# Patient Record
Sex: FEMALE | Race: WHITE | Employment: OTHER | URBAN - METROPOLITAN AREA
[De-identification: names, ages, dates, MRNs, and addresses within clinical notes are randomized per-mention and may not be internally consistent; named-entity substitution may affect disease eponyms.]

---

## 2024-10-25 RX ORDER — ANASTROZOLE 1 MG/1
1 TABLET ORAL DAILY
COMMUNITY
Start: 2024-07-12

## 2024-10-25 RX ORDER — ASPIRIN 81 MG/1
81 TABLET ORAL DAILY
COMMUNITY

## 2024-10-25 RX ORDER — ROSUVASTATIN CALCIUM 40 MG/1
40 TABLET, COATED ORAL DAILY
COMMUNITY
Start: 2024-06-02

## 2024-10-25 RX ORDER — VITAMIN B COMPLEX
2000 TABLET ORAL DAILY
COMMUNITY

## 2024-11-07 ENCOUNTER — ANESTHESIA EVENT (OUTPATIENT)
Age: 64
End: 2024-11-07
Payer: MEDICARE

## 2024-11-08 ENCOUNTER — HOSPITAL ENCOUNTER (OUTPATIENT)
Age: 64
Setting detail: OUTPATIENT SURGERY
Discharge: HOME | End: 2024-11-08
Attending: INTERNAL MEDICINE | Admitting: INTERNAL MEDICINE
Payer: MEDICARE

## 2024-11-08 ENCOUNTER — ANESTHESIA (OUTPATIENT)
Age: 64
End: 2024-11-08
Payer: MEDICARE

## 2024-11-08 VITALS
BODY MASS INDEX: 22.66 KG/M2 | SYSTOLIC BLOOD PRESSURE: 115 MMHG | TEMPERATURE: 97.8 F | HEIGHT: 69 IN | DIASTOLIC BLOOD PRESSURE: 62 MMHG | WEIGHT: 153 LBS | HEART RATE: 60 BPM | RESPIRATION RATE: 15 BRPM | OXYGEN SATURATION: 100 %

## 2024-11-08 DIAGNOSIS — Z12.11 COLON CANCER SCREENING: ICD-10-CM

## 2024-11-08 PROCEDURE — 7100000010 HC PHASE II RECOVERY - FIRST 15 MIN: Performed by: INTERNAL MEDICINE

## 2024-11-08 PROCEDURE — 2580000003 HC RX 258: Performed by: ANESTHESIOLOGY

## 2024-11-08 PROCEDURE — 3700000001 HC ADD 15 MINUTES (ANESTHESIA): Performed by: INTERNAL MEDICINE

## 2024-11-08 PROCEDURE — 7100000011 HC PHASE II RECOVERY - ADDTL 15 MIN: Performed by: INTERNAL MEDICINE

## 2024-11-08 PROCEDURE — 2709999900 HC NON-CHARGEABLE SUPPLY: Performed by: INTERNAL MEDICINE

## 2024-11-08 PROCEDURE — 2500000003 HC RX 250 WO HCPCS

## 2024-11-08 PROCEDURE — 88305 TISSUE EXAM BY PATHOLOGIST: CPT

## 2024-11-08 PROCEDURE — 3609010600 HC COLONOSCOPY POLYPECTOMY SNARE/COLD BIOPSY: Performed by: INTERNAL MEDICINE

## 2024-11-08 PROCEDURE — 6360000002 HC RX W HCPCS

## 2024-11-08 PROCEDURE — 3700000000 HC ANESTHESIA ATTENDED CARE: Performed by: INTERNAL MEDICINE

## 2024-11-08 RX ORDER — ONDANSETRON 2 MG/ML
4 INJECTION INTRAMUSCULAR; INTRAVENOUS
Status: DISCONTINUED | OUTPATIENT
Start: 2024-11-08 | End: 2024-11-08 | Stop reason: HOSPADM

## 2024-11-08 RX ORDER — PROPOFOL 10 MG/ML
INJECTION, EMULSION INTRAVENOUS
Status: DISCONTINUED | OUTPATIENT
Start: 2024-11-08 | End: 2024-11-08 | Stop reason: SDUPTHER

## 2024-11-08 RX ORDER — SODIUM CHLORIDE 0.9 % (FLUSH) 0.9 %
5-40 SYRINGE (ML) INJECTION PRN
Status: DISCONTINUED | OUTPATIENT
Start: 2024-11-08 | End: 2024-11-08 | Stop reason: HOSPADM

## 2024-11-08 RX ORDER — SODIUM CHLORIDE 0.9 % (FLUSH) 0.9 %
5-40 SYRINGE (ML) INJECTION EVERY 12 HOURS SCHEDULED
Status: DISCONTINUED | OUTPATIENT
Start: 2024-11-08 | End: 2024-11-08 | Stop reason: HOSPADM

## 2024-11-08 RX ORDER — NALOXONE HYDROCHLORIDE 0.4 MG/ML
INJECTION, SOLUTION INTRAMUSCULAR; INTRAVENOUS; SUBCUTANEOUS PRN
Status: DISCONTINUED | OUTPATIENT
Start: 2024-11-08 | End: 2024-11-08 | Stop reason: HOSPADM

## 2024-11-08 RX ORDER — SODIUM CHLORIDE 9 MG/ML
INJECTION, SOLUTION INTRAVENOUS PRN
Status: DISCONTINUED | OUTPATIENT
Start: 2024-11-08 | End: 2024-11-08 | Stop reason: HOSPADM

## 2024-11-08 RX ORDER — LIDOCAINE HYDROCHLORIDE 20 MG/ML
INJECTION, SOLUTION INFILTRATION; PERINEURAL
Status: DISCONTINUED | OUTPATIENT
Start: 2024-11-08 | End: 2024-11-08 | Stop reason: SDUPTHER

## 2024-11-08 RX ADMIN — LIDOCAINE HYDROCHLORIDE 80 MG: 20 INJECTION, SOLUTION INFILTRATION; PERINEURAL at 10:10

## 2024-11-08 RX ADMIN — Medication 10 ML: at 10:10

## 2024-11-08 RX ADMIN — Medication 20 ML: at 10:23

## 2024-11-08 RX ADMIN — PROPOFOL 70 MG: 10 INJECTION, EMULSION INTRAVENOUS at 10:10

## 2024-11-08 RX ADMIN — PROPOFOL 250 MCG/KG/MIN: 10 INJECTION, EMULSION INTRAVENOUS at 10:11

## 2024-11-08 ASSESSMENT — PAIN - FUNCTIONAL ASSESSMENT
PAIN_FUNCTIONAL_ASSESSMENT: 0-10

## 2024-11-08 ASSESSMENT — ENCOUNTER SYMPTOMS: SHORTNESS OF BREATH: 0

## 2024-11-08 NOTE — DISCHARGE INSTRUCTIONS
COLONSCOPY DISCHARGE INSTRUCTIONS    You may experience some lightheadedness for the next several hours.  Plan on quiet relaxation for the rest of today. Nap for four hours following procedure if possible.  A responsible adult needs to stay with you today.    Eat bland food and avoid anything greasy or spicy initially-progress to your normal diet gradually.  Diet restrictions as instructed.  You may resume home medications as instructed.    It is not unusual to experience some mild cramping or gas pains, and you may not have a bowel movement for several days.  If you had a polyp removed, avoid strenuous activity for 48 hours.  Avoid the use of aspirin or related compounds for one week, unless otherwise instructed by your physician.    You may notice a small amount of blood in your next few bowel movements, but if a large amount passes, call your physician.    If you have any of the following problems, notify your physician or return to the hospital emergency room : fever, chills, excessive bleeding, excessive vomiting, difficulty swallowing, uncontrolled pain, increased abdominal distention, shortness of breath or any other problems.    Call your doctor at [phone] if you have any concerns.     If you had any biopsies or polyps call for results in 5-7 business days.    See your physician's report for details about your procedure and recommendations.      ANESTHESIA DISCHARGE INSTRUCTIONS    Wear your seatbelt home.    You are under the influence of drugs-do not drink alcohol, drive ,operate machinery,or make any important decisions or sign any legal documentsfor 24 hours. You may resume normal activities tomorrow.    A responsible adult needs to be with you for 24 hours.    You may experience lightheadedness,dizziness,or sleepiness following surgery.    Rest at home today- increase activity as tolerated. It is recommended to take a four hour nap after procedure.    Progress slowly to a regular diet unless your  physician has instructed you otherwise. Avoid spicy and greasy food on first meal.  Drink plenty of water.    If nausea becomes a problem call your physician.

## 2024-11-08 NOTE — OP NOTE
[facility]  Patient: JULITO KEENE  MRN: [id number]  : 1960  Account: [id number]  Sex at Birth: Female  Age: 64 Years  Procedure: Colonoscopy  Date: 2024  Attending Physician: JONATHAN SHELTON  Indications:         -  Screening for colorectal malignant neoplasm  Medications:         -  See the Anesthesia note for documentation of the administered medications  Complications:         -  No immediate complications.  Estimated Blood Loss:         -  Estimated blood loss was minimal.  Procedure:         - The CF colon scope was introduced through the anus and advanced to the            cecum, identified by appendiceal orifice and ileocecal valve.         -  The patient tolerated the procedure well.         -  The quality of the bowel preparation was good.         -  The ileocecal valve, appendiceal orifice, and rectum were photographed.  Findings:         -  A 4 mm polyp was found in the cecum.  The polyp was sessile.  The polyp was            removed with a cold snare.   Resection and retrieval were complete.         -  Internal hemorrhoids were found during retroflexion.  The hemorrhoids were            mild.  Impression:         -  One 4 mm polyp in the cecum, removed with a cold snare.  Resected and            retrieved.         -  Internal hemorrhoids.  Recommendation:         -  Await pathology results.         -  Repeat colonoscopy for surveillance based on pathology results.  Procedure Code(s):         - 79179, Colonoscopy, flexible; with removal of tumor(s), polyp(s), or other            lesion(s) by snare technique  Diagnosis Code(s):         - Z12.11, Encounter for screening for malignant neoplasm of colon         - D12.0, Benign neoplasm of cecum         - K64.8, Other hemorrhoids        CPT(R) - 2023 copyright [employer]. All Rights Reserved.        The CPT codes, CCI edits and ICD codes generated are intended as suggestions        and were generated based on input data.   These codes are preliminary and upon         review may be revised to meet current compliance and payer requirements.        The provider is responsible for the final determination of appropriate codes,        and modifiers.  Scope Withdrawal Time:       00:08:28  JONATHAN SHELTON  This document has been electronically signed.  Note Initiated:11/8/2024  Note Completed:11/8/2024 10:27 AM

## 2024-11-08 NOTE — ANESTHESIA PRE PROCEDURE
Department of Anesthesiology  Preprocedure Note       Name:  Raisa Duvall   Age:  64 y.o.  :  1960                                          MRN:  [id number]         Date:  2024      Surgeon: Surgeon(s):  Emily Hardy MD    Procedure: Procedure(s):  COLONOSCOPY    Medications prior to admission:   Prior to Admission medications    Medication Sig Start Date End Date Taking? Authorizing Provider   aspirin 81 MG EC tablet Take 1 tablet by mouth daily   Yes Gabriele Lockwood MD   rosuvastatin (CRESTOR) 40 MG tablet Take 1 tablet by mouth daily 24  Yes Gabriele Lockwood MD   anastrozole (ARIMIDEX) 1 MG tablet Take 1 tablet by mouth daily 24  Yes ProviderGabriele MD   Berberine Chloride 500 MG CAPS Take by mouth With [place] Cinnamon   Yes ProviderGabriele MD   Vitamin D (CHOLECALCIFEROL) 25 MCG (1000 UT) TABS tablet Take 2 tablets by mouth daily    ProviderGabriele MD       Current medications:    Current Facility-Administered Medications   Medication Dose Route Frequency Provider Last Rate Last Admin   • sodium chloride flush 0.9 % injection 5-40 mL  5-40 mL IntraVENous 2 times per day Ze Gutierrez MD   10 mL at 24 1010   • sodium chloride flush 0.9 % injection 5-40 mL  5-40 mL IntraVENous PRN Ze Gutierrez MD       • 0.9 % sodium chloride infusion   IntraVENous PRN Ze Gutierrez MD         Facility-Administered Medications Ordered in Other Encounters   Medication Dose Route Frequency Provider Last Rate Last Admin   • lidocaine 2 % injection   IntraVENous Once PRN Kvng Sethi APRN - CRNA   80 mg at 24 1010   • propofol infusion   IntraVENous Once PRN Kvng Sethi APRN - CRNA 83.28 mL/hr at 24 1011 200 mcg/kg/min at 24 1011       Allergies:    Allergies   Allergen Reactions   • Sulfa Antibiotics Hives     Hives       Problem List:  There is no problem list on file for this patient.      Past Medical History:        Diagnosis Date   •

## 2024-11-08 NOTE — H&P
[facility] ENDOSCOPY  Outpatient Procedure H&P    Patient: Raisa Duvall MRN: [id number]     YOB: 1960  Age: 64 y.o.  Sex: female    Unit: [facility] ENDOSCOPY Room/Bed: Endo Pool/NONE Location: [facility]     Procedure: Procedure(s):  COLONOSCOPY    Indication: Pre-Op Diagnosis Codes:      * Colon cancer screening [Z12.11]    Referring  Physician:          Nurses past medical history notes reviewed and agreed.   Medications reviewed.    Allergies: Sulfa antibiotics     Allergies noted: Yes     Past Medical History:   Past Medical History:   Diagnosis Date    Cancer (HCC) 2022    Charcot-Kaelyn-Tooth disease     Slight Right Foot Drop    History of radiation therapy 2022    20 sessions for Breast Cancer    Hypercholesteremia        Past Surgical History:   Past Surgical History:   Procedure Laterality Date    APPENDECTOMY  2014    BREAST SURGERY Right 2022    Lumpectomy    CARDIAC CATHETERIZATION  2022    to check for calcium in arteries    HYSTERECTOMY (CERVIX STATUS UNKNOWN)  1999       Social History:   Social History     Socioeconomic History    Marital status:      Spouse name: Not on file    Number of children: Not on file    Years of education: Not on file    Highest education level: Not on file   Occupational History    Not on file   Tobacco Use    Smoking status: Never    Smokeless tobacco: Never   Vaping Use    Vaping status: Never Used   Substance and Sexual Activity    Alcohol use: Yes     Comment: Occasionally    Drug use: Never    Sexual activity: Not on file   Other Topics Concern    Not on file   Social History Narrative    Not on file     Social Determinants of Health     Financial Resource Strain: Not on file   Food Insecurity: Unknown (1/21/2024)    Received from [email or web address] and [facility]    Food Insecurities     Worried about running out of food: Not on file     Food Bought: Not on file   Transportation Needs: Unknown (1/21/2024)    Received from [email or web address] and

## 2024-11-08 NOTE — ANESTHESIA POSTPROCEDURE EVALUATION
Department of Anesthesiology  Postprocedure Note    Patient: Raisa Duvall  MRN: [id number]  YOB: 1960  Date of evaluation: 11/8/2024    Procedure Summary       Date: 11/08/24 Room / Location: [address]    Anesthesia Start: 1006 Anesthesia Stop: 1028    Procedure: COLONOSCOPY POLYPECTOMY SNARE/BIOPSY Diagnosis:       Colon cancer screening      (Colon cancer screening [Z12.11])    Surgeons: Emily Hardy MD Responsible Provider: Dustin Dobbins MD    Anesthesia Type: MAC ASA Status: 2            Anesthesia Type: MAC    Re Phase I: Re Score: 9    Re Phase II:      Anesthesia Post Evaluation    Patient location during evaluation: PACU  Level of consciousness: awake and alert  Airway patency: patent  Nausea & Vomiting: no nausea and no vomiting  Cardiovascular status: blood pressure returned to baseline  Respiratory status: acceptable  Hydration status: euvolemic  Comments: Postoperative Anesthesia Note    Name:    Raisa Duvall  MRN:      [id number]    Patient Vitals in the past 12 hrs:  11/08/24 1050, BP:102/63, Pulse:59, Resp:16, SpO2:99 %  11/08/24 1045, BP:118/66, Temp:97.8 °F (36.6 °C), Temp src:Infrared, Pulse:64, Resp:14, SpO2:96 %  11/08/24 1040, BP:(!) 95/59, Pulse:70, Resp:18, SpO2:100 %  11/08/24 1035, BP:(!) 94/57, Pulse:68, Resp:18, SpO2:97 %  11/08/24 1030, BP:90/73, Temp:97.3 °F (36.3 °C), Temp src:Infrared, Pulse:70, Resp:16, SpO2:95 %  11/08/24 0842, BP:137/67, Temp:98.6 °F (37 °C), Temp src:Infrared, Pulse:64, Resp:15, SpO2:99 %, Height:1.74 m (5' 8.5\"), Weight:69.4 kg (153 lb)     LABS:    CBC  No results found for: \"WBC\", \"HGB\", \"HCT\", \"PLT\"  RENAL  No results found for: \"NA\", \"K\", \"CL\", \"CO2\", \"BUN\", \"CREATININE\", \"GLUCOSE\"  COAGS  No results found for: \"PROTIME\", \"INR\", \"APTT\"    Intake & Output:  @24HRIO@    Nausea & Vomiting:  No    Level of Consciousness:  Awake    Pain Assessment:  Adequate analgesia    Anesthesia

## 2024-11-08 NOTE — PROGRESS NOTES
[facility] has verified that procedural consent is correctly filled out prior to the start of the procedure.    
Discharge instructions review with patient and , Michael. All home medications have been reviewed, pt v/u. Pt discharged via wheelchair. Pt discharged with all belongings.  taking stable pt home.     
Patient is resting in bed talking with family at the bedside. IV normal saline locked. Patient and family instructed to use call light for any needs that may arise between now and surgery time, verbalized understanding. Bed in lowest position, fall risk socks in place. Denies needs at present with call light in reach.    
Pt arrived from ENDO to POST bay 5. Reported received from ENDO staff. Pt arousable to voice. Abdomen soft. Pt on RA, NSR, and VSS. Will continue to monitor.    
basal rate on day of surgery. Long-acting insulin should be administered the evening before, take only half of usual dose. Always check with prescribing doctor if there are any questions.   [] Do not give any correction doses of insulin the morning of procedure.  [] GLP-1 inhibitor medications should be stopped 7 days prior to surgery.  [] Do not take any oral diabetic medications the morning of procedure.    []  You should shower the night before or the morning of surgery with an antibacterial soap i.e. safeguard or dial. Your surgeon may require you to use Hibiclens (an antiseptic skin cleanser) please follow physician’s instructions.     []  Do not shave or wax operative site 96 hours (4 days) prior to procedure.      [x]  No jewelry including body piercings, no makeup, or nail polish. No lotion, powders, creams, perfumes or metal hairclips.     [x]  Dress in loose comfortable clothing. Leave all valuables at home.    [x]  Please contact your surgeon if you are taking blood thinners, aspirin, anti-inflammatory medications, vitamins, or fish oil. They may require you to stop taking them 5-7 days prior.             ITEMS TO BRING TO THE HOSPITAL ON DOS:     []  Your prescribed rescue inhaler     [x]  ID and insurance card, form of payment if have co-pay, current medication list, living will and POA (if you have one).     []  Home c-pap and/or home O2 oxygen tank.      []  Any assistive medical devices (i.e.Walker, cane, wheelchair, etc.) or immobilizer or sling needed postoperatively      [x]  You may bring dentures, glasses, contacts, and/or hearing aids, however, they will need to be removed before your procedure. Please bring cases to store them in for safekeeping and leave them with the person you came with.        Our goal is to provide you with safe and excellent care. Please contact pre-admission testing if you have any further questions. (Please note, these are generalized instructions for all surgical

## 2024-11-11 LAB — SURGICAL PATHOLOGY REPORT: NORMAL

## (undated) DEVICE — ENDOSCOPIC KIT 1.1+ 6 FT 2 GWN AAMI LEVEL 3

## (undated) DEVICE — SOLUTION IRRIG 1000ML STRL H2O USP PLAS POUR BTL

## (undated) DEVICE — AIR/WATER CLEANING ADAPTER FOR OLYMPUS® GI ENDOSCOPE: Brand: BULLDOG®

## (undated) DEVICE — SYRINGE BLB 60 CC TIP PROTECTOR STRL LF

## (undated) DEVICE — TUBING, SUCTION, 1/4" X 12', STRAIGHT: Brand: MEDLINE

## (undated) DEVICE — SNARE COLD DIAMOND 10MM THIN

## (undated) DEVICE — SINGLE USE AIR/WATER, SUCTION AND BIOPSY VALVES SET: Brand: ORCAPOD™

## (undated) DEVICE — BW-412T DISP COMBO CLEANING BRUSH: Brand: SINGLE USE COMBINATION CLEANING BRUSH

## (undated) DEVICE — TRAP SPEC POLYP REM STRNR CLN DSGN MAGNIFYING WIND DISP